# Patient Record
Sex: MALE
[De-identification: names, ages, dates, MRNs, and addresses within clinical notes are randomized per-mention and may not be internally consistent; named-entity substitution may affect disease eponyms.]

---

## 2018-11-27 ENCOUNTER — HOSPITAL ENCOUNTER (INPATIENT)
Dept: HOSPITAL 14 - H.ER | Age: 43
LOS: 4 days | Discharge: HOME | DRG: 710 | End: 2018-12-01
Attending: HOSPITALIST | Admitting: HOSPITALIST
Payer: COMMERCIAL

## 2018-11-27 VITALS — BODY MASS INDEX: 20.3 KG/M2

## 2018-11-27 DIAGNOSIS — A41.9: Primary | ICD-10-CM

## 2018-11-27 DIAGNOSIS — R51: ICD-10-CM

## 2018-11-27 DIAGNOSIS — L02.31: ICD-10-CM

## 2018-11-27 DIAGNOSIS — Z80.3: ICD-10-CM

## 2018-11-27 DIAGNOSIS — R07.0: ICD-10-CM

## 2018-11-27 DIAGNOSIS — B95.62: ICD-10-CM

## 2018-11-27 DIAGNOSIS — M54.6: ICD-10-CM

## 2018-11-27 DIAGNOSIS — L03.317: ICD-10-CM

## 2018-11-27 DIAGNOSIS — R00.0: ICD-10-CM

## 2018-11-27 LAB
ALBUMIN SERPL-MCNC: 4.4 G/DL (ref 3.5–5)
ALBUMIN/GLOB SERPL: 1.1 {RATIO} (ref 1–2.1)
ALT SERPL-CCNC: 30 U/L (ref 21–72)
APTT BLD: 32.3 SECONDS (ref 25.6–37.1)
AST SERPL-CCNC: 42 U/L (ref 17–59)
BASE EXCESS BLDV CALC-SCNC: 3.8 MMOL/L (ref 0–2)
BASOPHILS # BLD AUTO: 0.1 K/UL (ref 0–0.2)
BASOPHILS NFR BLD: 0.4 % (ref 0–2)
BUN SERPL-MCNC: 18 MG/DL (ref 9–20)
CALCIUM SERPL-MCNC: 9 MG/DL (ref 8.4–10.2)
EOSINOPHIL # BLD AUTO: 0.1 K/UL (ref 0–0.7)
EOSINOPHIL NFR BLD: 0.6 % (ref 0–4)
EOSINOPHIL NFR BLD: 1 % (ref 0–7)
ERYTHROCYTE [DISTWIDTH] IN BLOOD BY AUTOMATED COUNT: 13.1 % (ref 11.5–14.5)
GFR NON-AFRICAN AMERICAN: > 60
HGB BLD-MCNC: 14.4 G/DL (ref 12–18)
INR PPP: 1.2
LYMPHOCYTE: 3 % (ref 20–50)
LYMPHOCYTES # BLD AUTO: 1.1 K/UL (ref 1–4.3)
LYMPHOCYTES NFR BLD AUTO: 7.6 % (ref 20–40)
MCH RBC QN AUTO: 29.1 PG (ref 27–31)
MCHC RBC AUTO-ENTMCNC: 33.2 G/DL (ref 33–37)
MCV RBC AUTO: 87.7 FL (ref 80–94)
MONOCYTE: 8 % (ref 0–10)
MONOCYTES # BLD: 1.2 K/UL (ref 0–0.8)
MONOCYTES NFR BLD: 8.7 % (ref 0–10)
NEUTROPHILS # BLD: 11.7 K/UL (ref 1.8–7)
NEUTROPHILS NFR BLD AUTO: 82.7 % (ref 50–75)
NEUTROPHILS NFR BLD AUTO: 85 % (ref 42–75)
NEUTS BAND NFR BLD: 2 % (ref 0–2)
NRBC BLD AUTO-RTO: 0.1 % (ref 0–0)
PCO2 BLDV: 42 MMHG (ref 40–60)
PH BLDV: 7.44 [PH] (ref 7.32–7.43)
PLATELET # BLD EST: NORMAL 10*3/UL
PLATELET # BLD: 234 K/UL (ref 130–400)
PMV BLD AUTO: 9 FL (ref 7.2–11.7)
PROTHROMBIN TIME: 13.9 SECONDS (ref 9.8–13.1)
RBC # BLD AUTO: 4.95 MIL/UL (ref 4.4–5.9)
RBC MORPH BLD: NORMAL
TOTAL CELLS COUNTED BLD: 100
VARIANT LYMPHS NFR BLD MANUAL: 1 % (ref 0–0)
VENOUS BLOOD FIO2: 21 %
VENOUS BLOOD GAS PO2: 38 MM/HG (ref 30–55)
WBC # BLD AUTO: 14.1 K/UL (ref 4.8–10.8)

## 2018-11-27 RX ADMIN — WATER SCH MLS/HR: 1 INJECTION INTRAMUSCULAR; INTRAVENOUS; SUBCUTANEOUS at 22:07

## 2018-11-27 NOTE — CP.PCM.HP
History of Present Illness





- History of Present Illness


History of Present Illness: 





HPI


Pt is a 43 y/o male who presented with [facility] ED with complaints of swelling, 

pain, and discharge from his buttock for the past 4 days. Reports that he first 

noticed pain and swelling than 3 days ago, he attempted to squeeze it and 

noticed bloody milky discharge. He denies fevers, but reports chills. Denies hx 

of trauma, scratches, or insect bites to the area. Also denies diarrhea, 

constipation, hematochezia, abdominal pain, n/v. Traveled from [facility] 7 months

ago.


 


ROS: + dark urine





PMD: None


PMHX: Headaches


PSurgHx: Denies


Medications: Ibuprofen 600mg po for headaches prn


NKDA


FmHx: Mother-Breast Ca, Sister- childhood paralysis


Social: Born in [facility], came 7 months ago. Works various jobs in maintenance. 

Denies sex with men. Denies smoking, alcohol use, or illicit drug use.  





ED Course:


-Vitals: T 101.1,  /82, O2 Sat 100% on rm air


-CBC: WBC 14.1,no bands, Lactate 1.1 CMP unremarkable


-Pelvic CT ordered


-Surgery Consulted





ED Interventions:


-S/P Clindamycin 


-S/P Torodol


-S/P NS 1000ml bolus




















Present on Admission





- Present on Admission


Any Indicators Present on Admission: No





Past Patient History





- Past Social History


Alcohol: None


Drugs: Denies





- PSYCHIATRIC


Hx Substance Use: No





- SURGICAL HISTORY


Hx Surgeries: No





Meds


Allergies/Adverse Reactions: 


                                    Allergies











Allergy/AdvReac Type Severity Reaction Status Date / Time


 


No Known Allergies Allergy   Verified 11/27/18 10:56














Physical Exam





- Constitutional


Appears: No Acute Distress





- Head Exam


Head Exam: ATRAUMATIC





- Eye Exam


Eye Exam: Normal appearance





- ENT Exam


ENT Exam: Mucous Membranes Moist





- Respiratory Exam


Respiratory Exam: Clear to Auscultation Bilateral.  absent: Rales, Wheezes





- Cardiovascular Exam


Cardiovascular Exam: REGULAR RHYTHM, +S1, +S2.  absent: Systolic Murmur





- GI/Abdominal Exam


GI & Abdominal Exam: Normal Bowel Sounds, Soft.  absent: Tenderness





- Rectal Exam


Rectal Exam: NORMAL INSPECTION





- Extremities Exam


Extremities exam: Positive for: normal capillary refill, normal inspection, 

pedal pulses present.  Negative for: calf tenderness, pedal edema, tenderness





- Neurological Exam


Neurological exam: Alert, Oriented x3





- Psychiatric Exam


Psychiatric exam: Normal Affect





- Skin


Additional comments: 


 Right inferior gluteal region- ~8cm x 4cm erythematous region, raised, 

diffusely indurated, no fluctuance, no discharge, extremely tender to palpation,

warm. 








Results





- Vital Signs


Recent Vital Signs: 





                                Last Vital Signs











Temp  97.8 F   11/27/18 17:25


 


Pulse  86   11/27/18 17:25


 


Resp  19   11/27/18 17:25


 


BP  120/69   11/27/18 17:25


 


Pulse Ox  98   11/27/18 17:25














- Labs


Result Diagrams: 


                                 11/27/18 12:45





                                 11/27/18 12:45


Labs: 





                         Laboratory Results - last 24 hr











  11/27/18 11/27/18 11/27/18





  12:23 12:45 12:45


 


WBC   14.1 H 


 


RBC   4.95 


 


Hgb   14.4 


 


Hct   43.4 


 


MCV   87.7 


 


MCH   29.1 


 


MCHC   33.2 


 


RDW   13.1 


 


Plt Count   234 


 


MPV   9.0 


 


Neut % (Auto)   82.7 H 


 


Lymph % (Auto)   7.6 L 


 


Mono % (Auto)   8.7 


 


Eos % (Auto)   0.6 


 


Baso % (Auto)   0.4 


 


Neut # (Auto)   11.7 H 


 


Lymph # (Auto)   1.1 


 


Mono # (Auto)   1.2 H 


 


Eos # (Auto)   0.1 


 


Baso # (Auto)   0.1 


 


Neutrophils % (Manual)   85 H 


 


Band Neutrophils %   2 


 


Lymphocytes % (Manual)   3 L 


 


Reactive Lymphs %   1 H 


 


Monocytes % (Manual)   8 


 


Eosinophils % (Manual)   1 


 


Platelet Estimate   Normal 


 


RBC Morphology   Normal 


 


PT   


 


INR   


 


APTT   


 


pO2  38  


 


VBG pH  7.44 H  


 


VBG pCO2  42  


 


VBG HCO3  27.3  


 


VBG Total CO2  29.8 H  


 


VBG O2 Sat (Calc)  80.4 H  


 


VBG Base Excess  3.8 H  


 


VBG Potassium  3.6  


 


Sodium  134.0   135


 


Chloride  101.0   100


 


Glucose  104  


 


Lactate  1.1  


 


FiO2  21.0  


 


Potassium    4.6


 


Carbon Dioxide    25


 


Anion Gap    15


 


BUN    18


 


Creatinine    0.9


 


Est GFR ( Amer)    > 60


 


Est GFR (Non-Af Amer)    > 60


 


Random Glucose    98


 


Calcium    9.0


 


Phosphorus    3.1


 


Magnesium    2.0


 


Total Bilirubin    1.4 H


 


AST    42


 


ALT    30


 


Alkaline Phosphatase    65


 


Total Protein    8.5 H


 


Albumin    4.4


 


Globulin    4.0 H


 


Albumin/Globulin Ratio    1.1


 


Venous Blood Potassium  3.6  














  11/27/18





  12:45


 


WBC 


 


RBC 


 


Hgb 


 


Hct 


 


MCV 


 


MCH 


 


MCHC 


 


RDW 


 


Plt Count 


 


MPV 


 


Neut % (Auto) 


 


Lymph % (Auto) 


 


Mono % (Auto) 


 


Eos % (Auto) 


 


Baso % (Auto) 


 


Neut # (Auto) 


 


Lymph # (Auto) 


 


Mono # (Auto) 


 


Eos # (Auto) 


 


Baso # (Auto) 


 


Neutrophils % (Manual) 


 


Band Neutrophils % 


 


Lymphocytes % (Manual) 


 


Reactive Lymphs % 


 


Monocytes % (Manual) 


 


Eosinophils % (Manual) 


 


Platelet Estimate 


 


RBC Morphology 


 


PT  13.9 H


 


INR  1.2


 


APTT  32.3


 


pO2 


 


VBG pH 


 


VBG pCO2 


 


VBG HCO3 


 


VBG Total CO2 


 


VBG O2 Sat (Calc) 


 


VBG Base Excess 


 


VBG Potassium 


 


Sodium 


 


Chloride 


 


Glucose 


 


Lactate 


 


FiO2 


 


Potassium 


 


Carbon Dioxide 


 


Anion Gap 


 


BUN 


 


Creatinine 


 


Est GFR ( Amer) 


 


Est GFR (Non-Af Amer) 


 


Random Glucose 


 


Calcium 


 


Phosphorus 


 


Magnesium 


 


Total Bilirubin 


 


AST 


 


ALT 


 


Alkaline Phosphatase 


 


Total Protein 


 


Albumin 


 


Globulin 


 


Albumin/Globulin Ratio 


 


Venous Blood Potassium 














Assessment & Plan





- Assessment and Plan (Free Text)


Assessment: 








Pt is a 43 y/o male who presented with [facility] ED admitted with Sepsis secondary to

Cellulites over gluteal region, CT scan ruled out abscess.





Cellulites, gluteal region


-Septic on admission: Febrile 101.1, Tachycardic 121, WBC 14.1, Lactate 1.1


-Source of infection likely Cellulitis; will get UA as patient was complaining 

of dark urine


-S/P Clindamycin x1 and 1L Bolus NS in ED


-Start Vancoymycin and Zosyn for empiric coverage


-Leg CT report: no drainable collection, no gas


-Surgery on board


-F/U BCx and Wcx


-Wound Care


-pain management as ordered





Diet


-Regular





DVT ppx


-Lovenox 40mg SC





Full Code








Discussed case with Dr. Larson

## 2018-11-27 NOTE — CP.PCM.CON
<Bridger Kent - Last Filed: 11/27/18 13:00>





History of Present Illness





- History of Present Illness


History of Present Illness: 





Surgery 





42 M w no sig PMH came with R buttock pain, redness and swelling started 1 week 

ago. It started w a small pimple. He sqeezed it and made it worse. There were sm

all amount of sanguinous , purulent discharge. Since then sxs got worse. Reports

fevers and concentrated urine. Pt had fever of 101 in ED. Denies nausea, 

vomiting, diarrhea, trauma, CP, SOB. 





PMH denies


PSH denies


SS no drugs, no etoh, no smoking. came from [facility] 7 month ago. LIves by 

himself. Family in [facility]. Does odd jobs, construction, restuarant, office 

works. Denies sex w men.   





 





Review of Systems





- Review of Systems


Review of Systems: 





See HPI 





Past Patient History





- Past Social History


Alcohol: None


Drugs: Denies





- PSYCHIATRIC


Hx Substance Use: No





- SURGICAL HISTORY


Hx Surgeries: No





Meds


Allergies/Adverse Reactions: 


                                    Allergies











Allergy/AdvReac Type Severity Reaction Status Date / Time


 


No Known Allergies Allergy   Verified 11/27/18 10:56














- Medications


Medications: 


                               Current Medications





Clindamycin Phosphate (Cleocin)  600 mg in 50 mls @ 50 mls/hr IVPB STAT STA; 

Protocol


   Stop: 11/27/18 12:39











Physical Exam





- Constitutional


Appears: No Acute Distress





- Head Exam


Head Exam: ATRAUMATIC, NORMAL INSPECTION, NORMOCEPHALIC





- Eye Exam


Eye Exam: EOMI, Normal appearance, PERRL


Pupil Exam: NORMAL ACCOMODATION, PERRL





- ENT Exam


ENT Exam: Mucous Membranes Moist





- Neck Exam


Neck exam: Positive for: Normal Inspection





- Respiratory Exam


Respiratory Exam: NORMAL BREATHING PATTERN





- Cardiovascular Exam


Cardiovascular Exam: Tachycardia





- GI/Abdominal Exam


GI & Abdominal Exam: Soft.  absent: Distended, Tenderness





- Rectal Exam


Rectal Exam: NORMAL INSPECTION.  absent: Black Stool, Bloody Stool, Hemorrhoids,

Fecal Impaction


Additional comments: 


[id number] eryhtema and induartion of R buttocks. Central excoriation. Minimal 

bloody, purulent drainage.  





-  Exam


 Exam: NORMAL INSPECTION





- Extremities Exam


Extremities exam: Positive for: full ROM, normal inspection





- Back Exam


Back exam: NORMAL INSPECTION





- Neurological Exam


Neurological exam: Alert, CN II-XII Intact, Normal Gait, Oriented x3, Reflexes 

Normal





- Psychiatric Exam


Psychiatric exam: Normal Affect, Normal Mood





- Skin


Skin Exam: Erythema, Warm





Results





- Vital Signs


Recent Vital Signs: 


                                Last Vital Signs











Temp  101.1 F H  11/27/18 10:08


 


Pulse  121 H  11/27/18 10:08


 


Resp      


 


BP  107/82   11/27/18 10:08


 


Pulse Ox  100   11/27/18 11:54














Assessment & Plan





- Assessment and Plan (Free Text)


Assessment: 





R buttocks cellulitis possible abscess: non fluctuant





-CT


-ABX


-f/u cx 


-IVF


-Warm compress: Not ready for I & D yet. 





DW Dr. Pace 





<Jonnathan Lizarraga - Last Filed: 11/28/18 13:21>





History of Present Illness





- History of Present Illness


History of Present Illness: 





Patient was seen and examined at the bedside.  Agree with resident's note above.





Meds





- Medications


Medications: 


                               Current Medications





Acetaminophen (Tylenol 325mg Tab)  650 mg PO Q6 PRN


   PRN Reason: fever


   Last Admin: 11/28/18 08:29 Dose:  650 mg


Docusate Sodium (Colace)  100 mg PO DAILY [facility]


   Last Admin: 11/28/18 08:30 Dose:  100 mg


Enoxaparin Sodium (Lovenox)  40 mg SC DAILY [facility]; Protocol


   Last Admin: 11/28/18 09:18 Dose:  40 mg


Vancomycin HCl 1 gm/ Sodium (Chloride)  250 mls @ 166.667 mls/hr IVPB 

Q12@0400,1600 [facility]; Protocol


   Last Admin: 11/28/18 04:16 Dose:  166.667 mls/hr


Sodium Chloride (Sodium Chloride 0.9%)  1,000 mls @ 100 mls/hr IV .Q10H [facility]


   Stop: 11/28/18 13:22


   Last Admin: 11/28/18 09:53 Dose:  Not Given


Piperacillin Sod/Tazobactam (Sod 3.375 gm/ Sodium Chloride)  100 mls @ 100 

mls/hr IVPB Q6 [facility]; Protocol


   Last Admin: 11/28/18 09:17 Dose:  100 mls/hr


Ibuprofen (Motrin Tab)  600 mg PO Q6H PRN


   PRN Reason: Pain, Mild (1-3)


Ketorolac Tromethamine (Toradol)  15 mg IVP Q6 PRN


   PRN Reason: Pain, moderate (4-7)


   Last Admin: 11/28/18 03:14 Dose:  15 mg











Results





- Vital Signs


Recent Vital Signs: 


                                Last Vital Signs











Temp  98.7 F   11/28/18 08:08


 


Pulse  71   11/28/18 08:08


 


Resp  18   11/28/18 08:08


 


BP  100/57 L  11/28/18 08:08


 


Pulse Ox  98   11/28/18 08:08














- Labs


Result Diagrams: 


                                 11/28/18 06:05





                                 11/27/18 12:45


Labs: 


                         Laboratory Results - last 24 hr











  11/27/18 11/28/18





  12:45 06:05


 


WBC   9.9


 


RBC   4.54


 


Hgb   13.2


 


Hct   39.3


 


MCV   86.6


 


MCH   29.0


 


MCHC   33.5


 


RDW   13.1


 


Plt Count   194


 


MPV   8.6


 


Neut % (Auto)   77.9 H


 


Lymph % (Auto)   10.9 L


 


Mono % (Auto)   7.9


 


Eos % (Auto)   2.8


 


Baso % (Auto)   0.5


 


Neut # (Auto)   7.7 H


 


Lymph # (Auto)   1.1


 


Mono # (Auto)   0.8


 


Eos # (Auto)   0.3


 


Baso # (Auto)   0.0


 


Neutrophils % (Manual)  85 H 


 


Band Neutrophils %  2 


 


Lymphocytes % (Manual)  3 L 


 


Reactive Lymphs %  1 H 


 


Monocytes % (Manual)  8 


 


Eosinophils % (Manual)  1 


 


Platelet Estimate  Normal 


 


RBC Morphology  Normal 














- Imaging and Cardiology


  ** CT scan - pelvis


Status: Image reviewed by me, Report reviewed by me





Assessment & Plan





- Assessment and Plan (Free Text)


Plan: 





- Continue antibiotics


- pain control


- NPO after midnight


- plan for Debridment and I&D in OR tomorrow


- Will follow

## 2018-11-27 NOTE — ED PDOC
HPI: General Adult


Time Seen by Provider: 11/27/18 11:07


Chief Complaint (Nursing): Abnormal Skin Integrity


Chief Complaint (Provider): Abscess


History Per: Patient


History/Exam Limitations: no limitations


Onset/Duration Of Symptoms: Days


Current Symptoms Are (Timing): Still Present


Additional Complaint(s): 





Pt. with abscess to buttox for 7 days.  States it started as a pimple and then 

grew bigger.  Purulent dc coming from the area.  Painful.  No numbness, tingles,

weakness, abd pain, chest pain, dyspnea.  No fever.  No weakness. 





Past Medical History


Reviewed: Nursing Documentation, Vital Signs


Vital Signs: 





                                Last Vital Signs











Temp  101.1 F H  11/27/18 10:08


 


Pulse  121 H  11/27/18 10:08


 


Resp      


 


BP  107/82   11/27/18 10:08


 


Pulse Ox  100   11/27/18 10:08














- Medical History


PMH: No Chronic Diseases





- Surgical History


Surgical History: No Surg Hx





- Family History


Family History: States: Unknown Family Hx





- Living Arrangements


Living Arrangements: With Family





- Social History


Alcohol: None


Drugs: Denies





- Allergies


Allergies/Adverse Reactions: 


                                    Allergies











Allergy/AdvReac Type Severity Reaction Status Date / Time


 


No Known Allergies Allergy   Verified 11/27/18 10:56














Review of Systems


ROS Statement: Except As Marked, All Systems Reviewed And Found Negative


Musculoskeletal: Positive for: Other (buttox)


Skin: Positive for: Rash





Physical Exam





- Reviewed


Nursing Documentation Reviewed: Yes


Vital Signs Reviewed: Yes





- Physical Exam


Appears: Positive for: No Acute Distress


Head Exam: Positive for: ATRAUMATIC, NORMAL INSPECTION, NORMOCEPHALIC


Skin: Positive for: Normal Color, Warm, DRY


Eye Exam: Positive for: EOMI, Normal appearance, PERRL


ENT: Positive for: Normal ENT Inspection


Neck: Positive for: Normal, Painless ROM


Cardiovascular/Chest: Positive for: Regular Rate, Rhythm


Respiratory: Positive for: CNT, Normal Breath Sounds


Gastrointestinal/Abdominal: Positive for: Normal Exam, Soft.  Negative for: 

Tenderness


Back: Positive for: Other (R buttox with 10cm diameter induration, erythema, 

tender, fluctuance; center with 2 cm diameter abrased skin).  Negative for: L 

CVA Tenderness, R CVA Tenderness


Extremity: Positive for: Normal ROM


Neurologic/Psych: Positive for: Alert, Oriented





- Laboratory Results


Result Diagrams: 


                                 11/27/18 12:45





                                 11/27/18 12:45


Interpretation Of Abn Labs: 14.1 wbc





- ECG


O2 Sat by Pulse Oximetry: 100


Pulse Ox Interpretation: Normal





- Progress


ED Course And Treament: 





1552:  Dr. Barrera to fu on ct.  Pt. getting antibiotics.  Has no pcp.  





Disposition





- Clinical Impression


Clinical Impression: 


 Sepsis, Cellulitis








- Patient ED Disposition


Is Patient to be Admitted: No


Counseled Patient/Family Regarding: Studies Performed, Diagnosis





- Disposition


Disposition Time: 15:52


Condition: FAIR


Patient Signed Over To: Phi Barrera

## 2018-11-27 NOTE — CT
Date of service: 



11/27/2018



PROCEDURE:  CT Pelvis with contrast



HISTORY:

eval for large deep abscess right glut



COMPARISON:

None available.



TECHNIQUE:

Contiguous axial images of the pelvis with contrast. Coronal and 

sagittal reformats generated.



Contrast dose: 90 cc Omnipaque 300



Radiation dose:



Total exam DLP = 244.58 mGy-cm.



This CT exam was performed using one or more of the following dose 

reduction techniques: Automated exposure control, adjustment of the 

mA and/or kV according to patient size, and/or use of iterative 

reconstruction technique.



FINDINGS:



BLADDER:

Unremarkable. No mass. 



REPRODUCTIVE ORGANS:

Unremarkable. 



VISUALIZED BOWEL:

Unremarkable.



PERITONEUM:

Unremarkable, as visualized. No free fluid. No free air. 



LYMPH NODES:

Unremarkable. No enlarged lymph nodes. 



VASCULATURE:

No aortic atherosclerotic calcification or mural plaque present. 



BONES:

No fracture or focal lesion.  



OTHER FINDINGS:

Cutaneous and subcutaneous inflammatory changes extending into the 

gluteus virginia muscle.  The muscle and surrounding soft tissues are 

edematous.  No drainable collection.  The findings do not extend into 

the pelvis.  No visible adjacent osseous abnormality.  At a more 

caudal level inflammatory changes extend medially to the adductor 

rajat and gross ileus muscles. 



IMPRESSION:

Inflammatory changes extending from the skin surface to the right 

gluteus virginia muscle.  The muscle is edematous without drainable 

collection. 



The more medial and caudal extent is bordered by the adductor rajat 

and gracilis musculature which do not appear to be grossly involved 

or affected by this process.



No discrete abscess, drainable collection, sinus tract or fistulous 

communication identified

## 2018-11-28 LAB
BASOPHILS # BLD AUTO: 0 K/UL (ref 0–0.2)
BASOPHILS NFR BLD: 0.5 % (ref 0–2)
EOSINOPHIL # BLD AUTO: 0.3 K/UL (ref 0–0.7)
EOSINOPHIL NFR BLD: 2.8 % (ref 0–4)
ERYTHROCYTE [DISTWIDTH] IN BLOOD BY AUTOMATED COUNT: 13.1 % (ref 11.5–14.5)
HGB BLD-MCNC: 13.2 G/DL (ref 12–18)
LYMPHOCYTES # BLD AUTO: 1.1 K/UL (ref 1–4.3)
LYMPHOCYTES NFR BLD AUTO: 10.9 % (ref 20–40)
MCH RBC QN AUTO: 29 PG (ref 27–31)
MCHC RBC AUTO-ENTMCNC: 33.5 G/DL (ref 33–37)
MCV RBC AUTO: 86.6 FL (ref 80–94)
MONOCYTES # BLD: 0.8 K/UL (ref 0–0.8)
MONOCYTES NFR BLD: 7.9 % (ref 0–10)
NEUTROPHILS # BLD: 7.7 K/UL (ref 1.8–7)
NEUTROPHILS NFR BLD AUTO: 77.9 % (ref 50–75)
NRBC BLD AUTO-RTO: 0 % (ref 0–0)
PLATELET # BLD: 194 K/UL (ref 130–400)
PMV BLD AUTO: 8.6 FL (ref 7.2–11.7)
RBC # BLD AUTO: 4.54 MIL/UL (ref 4.4–5.9)
WBC # BLD AUTO: 9.9 K/UL (ref 4.8–10.8)

## 2018-11-28 RX ADMIN — ENOXAPARIN SODIUM SCH MG: 40 INJECTION SUBCUTANEOUS at 09:18

## 2018-11-28 RX ADMIN — WATER SCH MLS/HR: 1 INJECTION INTRAMUSCULAR; INTRAVENOUS; SUBCUTANEOUS at 03:09

## 2018-11-28 RX ADMIN — WATER SCH MLS/HR: 1 INJECTION INTRAMUSCULAR; INTRAVENOUS; SUBCUTANEOUS at 21:24

## 2018-11-28 RX ADMIN — WATER SCH MLS/HR: 1 INJECTION INTRAMUSCULAR; INTRAVENOUS; SUBCUTANEOUS at 09:17

## 2018-11-28 RX ADMIN — WATER SCH MLS/HR: 1 INJECTION INTRAMUSCULAR; INTRAVENOUS; SUBCUTANEOUS at 15:54

## 2018-11-28 NOTE — CP.PCM.PN
Subjective





- Date & Time of Evaluation


Date of Evaluation: 11/28/18


Time of Evaluation: 11:34





- Subjective


Subjective: 





Pt is a 43 y/o male patient seen and evaluated at the bedside for right gluteal 

cellulitis. Patient states that the pain in his buttock decreased since 

yesterday to 6/10 on VAS scale. He states  that he has headache now 7/10. He 

states that his usine is darker than normal but he denies any burning sensation 

with urination. He denies any overnight fevers or chills. He denies nausea, 

vomitting, diarrhea, constipation, hematochezia, abdominal pain. 


 








Objective





- Vital Signs/Intake and Output


Vital Signs (last 24 hours): 


                                        











Temp Pulse Resp BP Pulse Ox


 


 98.7 F   71   18   100/57 L  98 


 


 11/28/18 08:08  11/28/18 08:08  11/28/18 08:08  11/28/18 08:08  11/28/18 08:08











- Medications


Medications: 


                               Current Medications





Acetaminophen (Tylenol 325mg Tab)  650 mg PO Q6 PRN


   PRN Reason: fever


   Last Admin: 11/28/18 08:29 Dose:  650 mg


Docusate Sodium (Colace)  100 mg PO DAILY [facility]


   Last Admin: 11/28/18 08:30 Dose:  100 mg


Enoxaparin Sodium (Lovenox)  40 mg SC DAILY [facility]; Protocol


   Last Admin: 11/28/18 09:18 Dose:  40 mg


Vancomycin HCl 1 gm/ Sodium (Chloride)  250 mls @ 166.667 mls/hr IVPB 

Q12@0400,1600 [facility]; Protocol


   Last Admin: 11/28/18 04:16 Dose:  166.667 mls/hr


Sodium Chloride (Sodium Chloride 0.9%)  1,000 mls @ 100 mls/hr IV .Q10H [facility]


   Stop: 11/28/18 13:22


   Last Admin: 11/28/18 09:53 Dose:  Not Given


Piperacillin Sod/Tazobactam (Sod 3.375 gm/ Sodium Chloride)  100 mls @ 100 

mls/hr IVPB Q6 [facility]; Protocol


   Last Admin: 11/28/18 09:17 Dose:  100 mls/hr


Ibuprofen (Motrin Tab)  600 mg PO Q6H PRN


   PRN Reason: Pain, Mild (1-3)


Ketorolac Tromethamine (Toradol)  15 mg IVP Q6 PRN


   PRN Reason: Pain, moderate (4-7)


   Last Admin: 11/28/18 03:14 Dose:  15 mg











- Labs


Labs: 


                                        





                                 11/28/18 06:05 





                                 11/27/18 12:45 





                                        











PT  13.9 Seconds (9.8-13.1)  H  11/27/18  12:45    


 


INR  1.2   11/27/18  12:45    


 


APTT  32.3 Seconds (25.6-37.1)   11/27/18  12:45    














- Constitutional


Appears: Well, No Acute Distress





- Head Exam


Head Exam: ATRAUMATIC, NORMOCEPHALIC





- Eye Exam


Eye Exam: EOMI, Normal appearance, PERRL


Pupil Exam: PERRL





- ENT Exam


ENT Exam: Mucous Membranes Moist, Normal Exam





- Neck Exam


Neck Exam: Full ROM, Normal Inspection





- Respiratory Exam


Respiratory Exam: Clear to Ausculation Bilateral, NORMAL BREATHING PATTERN





- Cardiovascular Exam


Cardiovascular Exam: REGULAR RHYTHM, RRR





- GI/Abdominal Exam


GI & Abdominal Exam: Soft, Normal Bowel Sounds





- Extremities Exam


Extremities Exam: Normal Capillary Refill, Normal Inspection





- Back Exam


Back Exam: NORMAL INSPECTION





- Neurological Exam


Neurological Exam: Alert, Awake, Oriented x3


Neuro motor strength exam: Left Upper Extremity: 5, Right Upper Extremity: 5, 

Left Lower Extremity: 5, Right Lower Extremity: 5





- Psychiatric Exam


Psychiatric exam: Normal Affect, Normal Mood





- Skin


Skin Exam: Dry


Additional comments: 








Right inferior gluteal region around 8cm x 4cm erythematous region, raised, 

diffusely indurated, no fluctuance, no discharge, extremely tender to palpation,

warm. central hard scab. No fluctuance

















Assessment and Plan





- Assessment and Plan (Free Text)


Assessment: 





Pt is a 43 y/o male patient seen and evaluated at the bedside for Sepsis 

secondary to Cellulites over gluteal region.








Plan: 





Cellulites, gluteal region


-Septic on admission: afebrile  WBC 9.9


-Source of infection likely Cellulitis


-C/W Vancoymycin and Zosyn IV abx


-Leg CT report: no drainable collection, no gas


-Surgery on board; reccs appreciated


-BCx: pending


-Wcx; gram positive cocci


-UA; pending


-Local wound Care


-C/W pain management as ordered





Diet


-Regular





DVT ppx


-Lovenox 40mg SC

## 2018-11-28 NOTE — CP.PCM.PN
<Eleanor Hall JOSE - Last Filed: 11/28/18 11:40>





Subjective





- Date & Time of Evaluation


Date of Evaluation: 11/28/18


Time of Evaluation: 11:32





- Subjective


Subjective: 


General Surgery


Today pt reports his pain has improved somewhat, as he is now able to sit 

normally without excruciating pain.  He denies having any chills or fever from 

the overnight.  Additionally he reports having a headache.





Labs and vitals noted





PE


Gen: Pt sitting in bed in NAD


Skin: warm, (+) right lateral/inferior buttock with ~20cm x 18cm area of erythe

ma, induration, center excoriation, (+) hot to touch, (-) fluctuance, (+) 

tender, (-) streaking, (+) margins not extended previously marked area, slight 

improvement.


Cardio: s1s2 RRR


Lungs: CTA bilaterally


Abd: Soft NTND


Extr: (-) calf tenderness bilaterally





A/P


Cellulitis of left buttock





Continue iv antibiotics


Warm compresses


Pain medications prn as ordered


Will need drainage once area becomes fluctuant.


Monitor labs and vitals.


 





Objective





- Vital Signs/Intake and Output


Vital Signs (last 24 hours): 


                                        











Temp Pulse Resp BP Pulse Ox


 


 98.7 F   71   18   100/57 L  98 


 


 11/28/18 08:08  11/28/18 08:08  11/28/18 08:08  11/28/18 08:08  11/28/18 08:08











- Medications


Medications: 


                               Current Medications





Acetaminophen (Tylenol 325mg Tab)  650 mg PO Q6 PRN


   PRN Reason: fever


   Last Admin: 11/28/18 08:29 Dose:  650 mg


Docusate Sodium (Colace)  100 mg PO DAILY [facility]


   Last Admin: 11/28/18 08:30 Dose:  100 mg


Enoxaparin Sodium (Lovenox)  40 mg SC DAILY [facility]; Protocol


   Last Admin: 11/28/18 09:18 Dose:  40 mg


Vancomycin HCl 1 gm/ Sodium (Chloride)  250 mls @ 166.667 mls/hr IVPB 

Q12@0400,1600 [facility]; Protocol


   Last Admin: 11/28/18 04:16 Dose:  166.667 mls/hr


Sodium Chloride (Sodium Chloride 0.9%)  1,000 mls @ 100 mls/hr IV .Q10H [facility]


   Stop: 11/28/18 13:22


   Last Admin: 11/28/18 09:53 Dose:  Not Given


Piperacillin Sod/Tazobactam (Sod 3.375 gm/ Sodium Chloride)  100 mls @ 100 

mls/hr IVPB Q6 [facility]; Protocol


   Last Admin: 11/28/18 09:17 Dose:  100 mls/hr


Ibuprofen (Motrin Tab)  600 mg PO Q6H PRN


   PRN Reason: Pain, Mild (1-3)


Ketorolac Tromethamine (Toradol)  15 mg IVP Q6 PRN


   PRN Reason: Pain, moderate (4-7)


   Last Admin: 11/28/18 03:14 Dose:  15 mg











- Labs


Labs: 


                                        





                                 11/28/18 06:05 





                                 11/27/18 12:45 





                                        











PT  13.9 Seconds (9.8-13.1)  H  11/27/18  12:45    


 


INR  1.2   11/27/18  12:45    


 


APTT  32.3 Seconds (25.6-37.1)   11/27/18  12:45    














<Jonnathan Lizarraga - Last Filed: 11/28/18 13:01>





Subjective





- Subjective


Subjective: 





Patient was seen and examined at the bedside. Agree with the note above.





Objective





- Vital Signs/Intake and Output


Vital Signs (last 24 hours): 


                                        











Temp Pulse Resp BP Pulse Ox


 


 98.7 F   71   18   100/57 L  98 


 


 11/28/18 08:08  11/28/18 08:08  11/28/18 08:08  11/28/18 08:08  11/28/18 08:08











- Medications


Medications: 


                               Current Medications





Acetaminophen (Tylenol 325mg Tab)  650 mg PO Q6 PRN


   PRN Reason: fever


   Last Admin: 11/28/18 08:29 Dose:  650 mg


Docusate Sodium (Colace)  100 mg PO DAILY [facility]


   Last Admin: 11/28/18 08:30 Dose:  100 mg


Enoxaparin Sodium (Lovenox)  40 mg SC DAILY [facility]; Protocol


   Last Admin: 11/28/18 09:18 Dose:  40 mg


Vancomycin HCl 1 gm/ Sodium (Chloride)  250 mls @ 166.667 mls/hr IVPB 

Q12@0400,1600 [facility]; Protocol


   Last Admin: 11/28/18 04:16 Dose:  166.667 mls/hr


Sodium Chloride (Sodium Chloride 0.9%)  1,000 mls @ 100 mls/hr IV .Q10H [facility]


   Stop: 11/28/18 13:22


   Last Admin: 11/28/18 09:53 Dose:  Not Given


Piperacillin Sod/Tazobactam (Sod 3.375 gm/ Sodium Chloride)  100 mls @ 100 

mls/hr IVPB Q6 [facility]; Protocol


   Last Admin: 11/28/18 09:17 Dose:  100 mls/hr


Ibuprofen (Motrin Tab)  600 mg PO Q6H PRN


   PRN Reason: Pain, Mild (1-3)


Ketorolac Tromethamine (Toradol)  15 mg IVP Q6 PRN


   PRN Reason: Pain, moderate (4-7)


   Last Admin: 11/28/18 03:14 Dose:  15 mg











- Labs


Labs: 


                                        





                                 11/28/18 06:05 





                                 11/27/18 12:45 





                                        











PT  13.9 Seconds (9.8-13.1)  H  11/27/18  12:45    


 


INR  1.2   11/27/18  12:45    


 


APTT  32.3 Seconds (25.6-37.1)   11/27/18  12:45    














Assessment and Plan





- Assessment and Plan (Free Text)


Assessment: 





42 y.o. male with right buttocks abscess and necrotic tissue


Plan: 





- Continue antibiotics


- Pain control


- NPO after midnight


- To OR tomorrow for I&D


- Will follow

## 2018-11-28 NOTE — CP.PCM.CON
History of Present Illness





- History of Present Illness


History of Present Illness: 


General Surgery





Agree with resident note.  Today pt reports his pain has improved somewhat, as 

he is now able to sit normally without excruciating pain.  He denies having any 

chills or fever from the overnight.  He reports he still has a headache since 

admission.








Past Patient History





- Past Medical History & Family History


Past Medical History?: No





- Past Social History


Smoking Status: Never Smoked





- MUSCULOSKELETAL/RHEUMATOLOGICAL


Hx Falls: No





- PSYCHIATRIC


Hx Psychophysiologic Disorder: No


Hx Substance Use: No





- SURGICAL HISTORY


Hx Surgeries: No





- ANESTHESIA


Hx Anesthesia: No





Meds


Allergies/Adverse Reactions: 


                                    Allergies











Allergy/AdvReac Type Severity Reaction Status Date / Time


 


No Known Allergies Allergy   Verified 11/27/18 10:56














- Medications


Medications: 


                               Current Medications





Acetaminophen (Tylenol 325mg Tab)  650 mg PO Q6 PRN


   PRN Reason: fever


   Last Admin: 11/28/18 08:29 Dose:  650 mg


Docusate Sodium (Colace)  100 mg PO DAILY [facility]


   Last Admin: 11/28/18 08:30 Dose:  100 mg


Enoxaparin Sodium (Lovenox)  40 mg SC DAILY [facility]; Protocol


   Last Admin: 11/28/18 09:18 Dose:  40 mg


Vancomycin HCl 1 gm/ Sodium (Chloride)  250 mls @ 166.667 mls/hr IVPB 

Q12@0400,1600 [facility]; Protocol


   Last Admin: 11/28/18 04:16 Dose:  166.667 mls/hr


Sodium Chloride (Sodium Chloride 0.9%)  1,000 mls @ 100 mls/hr IV .Q10H [facility]


   Stop: 11/28/18 13:22


   Last Admin: 11/28/18 09:53 Dose:  Not Given


Piperacillin Sod/Tazobactam (Sod 3.375 gm/ Sodium Chloride)  100 mls @ 100 mls

/hr IVPB Q6 [facility]; Protocol


   Last Admin: 11/28/18 09:17 Dose:  100 mls/hr


Ibuprofen (Motrin Tab)  600 mg PO Q6H PRN


   PRN Reason: Pain, Mild (1-3)


Ketorolac Tromethamine (Toradol)  15 mg IVP Q6 PRN


   PRN Reason: Pain, moderate (4-7)


   Last Admin: 11/28/18 03:14 Dose:  15 mg











Results





- Vital Signs


Recent Vital Signs: 


                                Last Vital Signs











Temp  98.7 F   11/28/18 08:08


 


Pulse  71   11/28/18 08:08


 


Resp  18   11/28/18 08:08


 


BP  100/57 L  11/28/18 08:08


 


Pulse Ox  98   11/28/18 08:08














- Labs


Result Diagrams: 


                                 11/28/18 06:05





                                 11/27/18 12:45


Labs: 


                         Laboratory Results - last 24 hr











  11/27/18 11/27/18 11/27/18





  12:23 12:45 12:45


 


WBC   14.1 H 


 


RBC   4.95 


 


Hgb   14.4 


 


Hct   43.4 


 


MCV   87.7 


 


MCH   29.1 


 


MCHC   33.2 


 


RDW   13.1 


 


Plt Count   234 


 


MPV   9.0 


 


Neut % (Auto)   82.7 H 


 


Lymph % (Auto)   7.6 L 


 


Mono % (Auto)   8.7 


 


Eos % (Auto)   0.6 


 


Baso % (Auto)   0.4 


 


Neut # (Auto)   11.7 H 


 


Lymph # (Auto)   1.1 


 


Mono # (Auto)   1.2 H 


 


Eos # (Auto)   0.1 


 


Baso # (Auto)   0.1 


 


Neutrophils % (Manual)   85 H 


 


Band Neutrophils %   2 


 


Lymphocytes % (Manual)   3 L 


 


Reactive Lymphs %   1 H 


 


Monocytes % (Manual)   8 


 


Eosinophils % (Manual)   1 


 


Platelet Estimate   Normal 


 


RBC Morphology   Normal 


 


PT   


 


INR   


 


APTT   


 


pO2  38  


 


VBG pH  7.44 H  


 


VBG pCO2  42  


 


VBG HCO3  27.3  


 


VBG Total CO2  29.8 H  


 


VBG O2 Sat (Calc)  80.4 H  


 


VBG Base Excess  3.8 H  


 


VBG Potassium  3.6  


 


Sodium  134.0   135


 


Chloride  101.0   100


 


Glucose  104  


 


Lactate  1.1  


 


FiO2  21.0  


 


Potassium    4.6


 


Carbon Dioxide    25


 


Anion Gap    15


 


BUN    18


 


Creatinine    0.9


 


Est GFR ( Amer)    > 60


 


Est GFR (Non-Af Amer)    > 60


 


Random Glucose    98


 


Calcium    9.0


 


Phosphorus    3.1


 


Magnesium    2.0


 


Total Bilirubin    1.4 H


 


AST    42


 


ALT    30


 


Alkaline Phosphatase    65


 


Total Protein    8.5 H


 


Albumin    4.4


 


Globulin    4.0 H


 


Albumin/Globulin Ratio    1.1


 


Venous Blood Potassium  3.6  














  11/27/18 11/28/18





  12:45 06:05


 


WBC   9.9


 


RBC   4.54


 


Hgb   13.2


 


Hct   39.3


 


MCV   86.6


 


MCH   29.0


 


MCHC   33.5


 


RDW   13.1


 


Plt Count   194


 


MPV   8.6


 


Neut % (Auto)   77.9 H


 


Lymph % (Auto)   10.9 L


 


Mono % (Auto)   7.9


 


Eos % (Auto)   2.8


 


Baso % (Auto)   0.5


 


Neut # (Auto)   7.7 H


 


Lymph # (Auto)   1.1


 


Mono # (Auto)   0.8


 


Eos # (Auto)   0.3


 


Baso # (Auto)   0.0


 


Neutrophils % (Manual)  


 


Band Neutrophils %  


 


Lymphocytes % (Manual)  


 


Reactive Lymphs %  


 


Monocytes % (Manual)  


 


Eosinophils % (Manual)  


 


Platelet Estimate  


 


RBC Morphology  


 


PT  13.9 H 


 


INR  1.2 


 


APTT  32.3 


 


pO2  


 


VBG pH  


 


VBG pCO2  


 


VBG HCO3  


 


VBG Total CO2  


 


VBG O2 Sat (Calc)  


 


VBG Base Excess  


 


VBG Potassium  


 


Sodium  


 


Chloride  


 


Glucose  


 


Lactate  


 


FiO2  


 


Potassium  


 


Carbon Dioxide  


 


Anion Gap  


 


BUN  


 


Creatinine  


 


Est GFR ( Amer)  


 


Est GFR (Non-Af Amer)  


 


Random Glucose  


 


Calcium  


 


Phosphorus  


 


Magnesium  


 


Total Bilirubin  


 


AST  


 


ALT  


 


Alkaline Phosphatase  


 


Total Protein  


 


Albumin  


 


Globulin  


 


Albumin/Globulin Ratio  


 


Venous Blood Potassium

## 2018-11-29 LAB
BASOPHILS # BLD AUTO: 0 K/UL (ref 0–0.2)
BASOPHILS NFR BLD: 0.3 % (ref 0–2)
BUN SERPL-MCNC: 12 MG/DL (ref 9–20)
CALCIUM SERPL-MCNC: 8.7 MG/DL (ref 8.4–10.2)
EOSINOPHIL # BLD AUTO: 0.4 K/UL (ref 0–0.7)
EOSINOPHIL NFR BLD: 4.9 % (ref 0–4)
ERYTHROCYTE [DISTWIDTH] IN BLOOD BY AUTOMATED COUNT: 13.2 % (ref 11.5–14.5)
GFR NON-AFRICAN AMERICAN: > 60
HGB BLD-MCNC: 12.8 G/DL (ref 12–18)
LYMPHOCYTES # BLD AUTO: 1.2 K/UL (ref 1–4.3)
LYMPHOCYTES NFR BLD AUTO: 13.7 % (ref 20–40)
MCH RBC QN AUTO: 29.3 PG (ref 27–31)
MCHC RBC AUTO-ENTMCNC: 33.2 G/DL (ref 33–37)
MCV RBC AUTO: 88.4 FL (ref 80–94)
MONOCYTES # BLD: 0.7 K/UL (ref 0–0.8)
MONOCYTES NFR BLD: 8.2 % (ref 0–10)
NEUTROPHILS # BLD: 6.4 K/UL (ref 1.8–7)
NEUTROPHILS NFR BLD AUTO: 72.9 % (ref 50–75)
NRBC BLD AUTO-RTO: 0.1 % (ref 0–0)
PLATELET # BLD: 228 K/UL (ref 130–400)
PMV BLD AUTO: 8.7 FL (ref 7.2–11.7)
RBC # BLD AUTO: 4.38 MIL/UL (ref 4.4–5.9)
WBC # BLD AUTO: 8.8 K/UL (ref 4.8–10.8)

## 2018-11-29 PROCEDURE — 0J990ZZ DRAINAGE OF BUTTOCK SUBCUTANEOUS TISSUE AND FASCIA, OPEN APPROACH: ICD-10-PCS | Performed by: SURGERY

## 2018-11-29 PROCEDURE — 0JB90ZZ EXCISION OF BUTTOCK SUBCUTANEOUS TISSUE AND FASCIA, OPEN APPROACH: ICD-10-PCS | Performed by: SURGERY

## 2018-11-29 RX ADMIN — WATER SCH MLS/HR: 1 INJECTION INTRAMUSCULAR; INTRAVENOUS; SUBCUTANEOUS at 03:42

## 2018-11-29 NOTE — OP
PROCEDURE DATE:  11/29/2018





PREOPERATIVE DIAGNOSES:  Cellulitis and abscess of the right buttock.



POSTOPERATIVE DIAGNOSES:  Cellulitis and abscess of the right buttock.



PROCEDURE:  Incision and drainage and debridement of the necrotic tissue

and an abscess of the right buttock.



SURGEON:  Jonnathan Lizarraga MD.



ASSISTANT:   _____, surgical resident.



ANESTHESIA ADMINISTERED BY:  Rachel Grady MD.



TYPE OF ANESTHESIA:  General endotracheal intubation.



IV FLUIDS:  Crystalloids.



ESTIMATED BLOOD LOSS:  10 mL.



INTRAOPERATIVE FINDINGS:  Necrotic tissue and 10 mL of purulent material

expressed.



SPECIMEN:  Necrotic tissue.



BRIEF HISTORY:  Mr. Tam Hall is a very pleasant 42-year-old gentleman,

who presented to the hospital complaining of severe pain to the right

buttock area that started several days ago and as per patient it started as

a little pimple and subsequently developed into a big abscess.



All the risk and benefits of the procedure were explained to the patient

and with the patient having the full understanding of the all the risks and

benefits involved, informed consent was obtained, and the patient was taken

to the operating room for above stated procedure.



DESCRIPTION OF PROCEDURE:  The patient was brought into the operating room

and placed supine on the operating table.  After successful induction of

anesthesia and successful endotracheal intubation by the anesthesia team,

bilateral Flowtron boots were applied to the patient's lower extremities,

and subsequent to that the patient was placed in a left lateral decubitus

position and at this point in time, patient's right buttock area was

prepped with Betadine and draped in a standard surgical fashion.  Prior to

the beginning of the procedure, time-out was called in the room and

everyone in the room were in agreement.  Using a 15-blade scalpel knife,

the necrotic tissue on top of the abscess was excised and passed off to the

[facility] as a specimen.  At this point in time, some more necrotic

material was encountered deeper in the wound that was excised with 15-blade

scalpel knife.  Subsequent to that we were able to express approximately 10

mL of pus.  The patient's abscess cavity was irrigated and dried. 

Hemostasis was achieved with electrocautery and subsequent to that the

wound was packed with 0.25-inch iodoform packing.  At this point in time,

the patient's buttock was washed and dried and 4 x 4s, ABD, and some tape

were applied to the side of the wound.  The patient was successfully turned

back to the supine position, successfully extubated by the Anesthesia Team,

transferred to the stretcher and taken to the recovery room in a stable

condition.  At the end of the procedure, all instrument counts, needles,

and sponges were correct.







__________________________________________

Jonnathan Lizarraga MD



DD:  11/29/2018 9:56:33

DT:  11/29/2018 10:27:55

Job # [id number]

## 2018-11-29 NOTE — CP.PCM.PN
Subjective





- Date & Time of Evaluation


Date of Evaluation: 11/29/18


Time of Evaluation: 11:04





- Subjective


Subjective: 





Pt is a 43 y/o male patient seen and evaluated at the PACU S/P I&D of right 

gluteal cellulitis and abscess. Patient states that he doesn't have pain in his 

buttocks now. He denies any headache, fevers or chills. He denies any nausea, vo

mitting, diarrhea, constipation or abdominal pain. 


 




















Objective





- Vital Signs/Intake and Output


Vital Signs (last 24 hours): 


                                        











Temp Pulse Resp BP Pulse Ox


 


 97.7 F   93 H  20   125/74   100 


 


 11/29/18 10:06  11/29/18 10:06  11/29/18 10:06  11/29/18 10:06  11/29/18 10:06








Intake and Output: 


                                        











 11/29/18 11/29/18





 06:59 18:59


 


Intake Total  300


 


Balance  300














- Medications


Medications: 


                               Current Medications





Acetaminophen (Tylenol 325mg Tab)  650 mg PO Q6 PRN


   PRN Reason: fever


   Last Admin: 11/28/18 08:29 Dose:  650 mg


Docusate Sodium (Colace)  100 mg PO DAILY [facility]


   Last Admin: 11/28/18 08:30 Dose:  100 mg


Enoxaparin Sodium (Lovenox)  40 mg SC DAILY [facility]; Protocol


   Last Admin: 11/28/18 09:18 Dose:  40 mg


Hydromorphone HCl (Dilaudid)  0.5 mg IVP Q10M PRN


   PRN Reason: Pain, moderate (4-7)


Vancomycin HCl 1 gm/ Sodium (Chloride)  250 mls @ 166.667 mls/hr IVPB 

Q12@0400,1600 [facility]; Protocol


   Last Admin: 11/29/18 04:54 Dose:  166.667 mls/hr


Lactated Ringer's (Lactated Ringer's)  1,000 mls @ 125 mls/hr IV .Q8H [facility]


   Last Admin: 11/29/18 10:06 Dose:  0 mls


Morphine Sulfate (Morphine)  2 mg IVP Q4 PRN


   PRN Reason: Pain, severe (8-10)


Oxycodone/Acetaminophen (Percocet 5/325 Mg Tab)  1 tab PO Q4 PRN


   PRN Reason: Pain, Mild (1-3)


   Stop: 12/02/18 10:11


Oxycodone/Acetaminophen (Percocet 5/325 Mg Tab)  2 tab PO Q4 PRN


   PRN Reason: Pain, moderate (4-7)


   Stop: 12/02/18 10:11











- Labs


Labs: 


                                        





                                 11/29/18 05:45 





                                 11/29/18 05:45 





                                        











PT  13.9 Seconds (9.8-13.1)  H  11/27/18  12:45    


 


INR  1.2   11/27/18  12:45    


 


APTT  32.3 Seconds (25.6-37.1)   11/27/18  12:45    














- Constitutional


Appears: No Acute Distress





- Head Exam


Head Exam: ATRAUMATIC, NORMOCEPHALIC





- Eye Exam


Eye Exam: EOMI, Normal appearance, PERRL


Pupil Exam: NORMAL ACCOMODATION, PERRL





- ENT Exam


ENT Exam: Mucous Membranes Moist, Normal Exam





- Neck Exam


Neck Exam: Full ROM, Normal Inspection





- Respiratory Exam


Respiratory Exam: Clear to Ausculation Bilateral, NORMAL BREATHING PATTERN





- Cardiovascular Exam


Cardiovascular Exam: REGULAR RHYTHM, RRR





- GI/Abdominal Exam


GI & Abdominal Exam: Soft, Normal Bowel Sounds





- Extremities Exam


Extremities Exam: Normal Capillary Refill, Normal Inspection





- Back Exam


Back Exam: NORMAL INSPECTION





- Neurological Exam


Neurological Exam: Alert, Awake, Oriented x3


Neuro motor strength exam: Left Upper Extremity: 5, Right Upper Extremity: 5, 

Left Lower Extremity: 5, Right Lower Extremity: 5





- Psychiatric Exam


Psychiatric exam: Normal Affect, Normal Mood





- Skin


Skin Exam: Dry, Warm


Additional comments: 





Right gluteal region is dressed with DSD S/P I&D of the Right gluteal abscess























Assessment and Plan





- Assessment and Plan (Free Text)


Assessment: 





Pt is a 43 y/o male patient seen and evaluated S/P right gluteal cellulitis and 

abscess I&D.

















Plan: 





Cellulites, gluteal region


-Septic on admission: afebrile  WBC 8.8


-Source of infection likely Cellulitis


-C/W Vancoymycin and Zosyn IV abx


-Leg CT report: no drainable collection, no gas


-Surgery on board; reccs appreciated


-Patient went to the OR today, I&D of the right gluteal abscess done.


-BCx: No growth after 24 hours


-Wcx; MRSA


-UA; pending


-C/W Local wound Care as per surgery.


-C/W pain management as ordered





Diet


-Regular





DVT ppx


-Lovenox 40mg SC

## 2018-11-29 NOTE — RAD
Date of service: 



11/29/2018



HISTORY:

 pre-op 



COMPARISON:

No prior. 



FINDINGS:



LUNGS:

No active pulmonary disease.



PLEURA:

No significant pleural effusion identified, no pneumothorax apparent.



CARDIOVASCULAR:

No aortic atherosclerotic calcification present.



Normal cardiac size. No pulmonary vascular congestion. 



OSSEOUS STRUCTURES:

No significant abnormalities.



VISUALIZED UPPER ABDOMEN:

Normal.



OTHER FINDINGS:

None.



IMPRESSION:

No active disease.

## 2018-11-29 NOTE — PCM.SURG1
Surgeon's Initial Post Op Note





- Surgeon's Notes


Surgeon: Dr. Lizarraga


Assistant: Dr. Coles


Type of Anesthesia: General Endo


Pre-Operative Diagnosis: Right gluteal abscess


Operative Findings: right gluteal abscess with necrotic tissue and purulent 

drainage (10cc)


Post-Operative Diagnosis: Right gluteal abscess


Operation Performed: right gluteal abscess incision, drainage and debridement of

necrotic tissue


Specimen/Specimens Removed: necrotic tissue


Estimated Blood Loss: EBL {In ML}: 25


Blood Products Given: N/A


Drains Used: No Drains


Post-Op Condition: Fair


Date of Surgery/Procedure: 11/29/18


Time of Surgery/Procedure: 09:00

## 2018-11-29 NOTE — CARD
--------------- APPROVED REPORT --------------





Date of service: 11/29/2018



EKG Measurement

Heart [id number]

NM [id number]

[id number]

[id number]

[id number]



<Conclusion>

Normal sinus rhythm

Normal ECG

## 2018-11-30 VITALS — RESPIRATION RATE: 20 BRPM

## 2018-11-30 VITALS — OXYGEN SATURATION: 97 %

## 2018-11-30 LAB
ALBUMIN SERPL-MCNC: 3 G/DL (ref 3.5–5)
ALBUMIN/GLOB SERPL: 0.9 {RATIO} (ref 1–2.1)
ALT SERPL-CCNC: 37 U/L (ref 21–72)
AST SERPL-CCNC: 26 U/L (ref 17–59)
BASOPHILS # BLD AUTO: 0 K/UL (ref 0–0.2)
BASOPHILS NFR BLD: 0.5 % (ref 0–2)
BUN SERPL-MCNC: 16 MG/DL (ref 9–20)
CALCIUM SERPL-MCNC: 8.9 MG/DL (ref 8.4–10.2)
EOSINOPHIL # BLD AUTO: 0.3 K/UL (ref 0–0.7)
EOSINOPHIL NFR BLD: 3.7 % (ref 0–4)
ERYTHROCYTE [DISTWIDTH] IN BLOOD BY AUTOMATED COUNT: 12.9 % (ref 11.5–14.5)
GFR NON-AFRICAN AMERICAN: > 60
HGB BLD-MCNC: 13 G/DL (ref 12–18)
LYMPHOCYTES # BLD AUTO: 1.9 K/UL (ref 1–4.3)
LYMPHOCYTES NFR BLD AUTO: 28.3 % (ref 20–40)
MCH RBC QN AUTO: 28.9 PG (ref 27–31)
MCHC RBC AUTO-ENTMCNC: 32.6 G/DL (ref 33–37)
MCV RBC AUTO: 88.7 FL (ref 80–94)
MONOCYTES # BLD: 0.6 K/UL (ref 0–0.8)
MONOCYTES NFR BLD: 8.1 % (ref 0–10)
NEUTROPHILS # BLD: 4.1 K/UL (ref 1.8–7)
NEUTROPHILS NFR BLD AUTO: 59.4 % (ref 50–75)
NRBC BLD AUTO-RTO: 0.1 % (ref 0–0)
PLATELET # BLD: 252 K/UL (ref 130–400)
PMV BLD AUTO: 8.6 FL (ref 7.2–11.7)
RBC # BLD AUTO: 4.49 MIL/UL (ref 4.4–5.9)
WBC # BLD AUTO: 6.9 K/UL (ref 4.8–10.8)

## 2018-11-30 RX ADMIN — PURIFIED WATER PRN LOZ: 99.05 LIQUID OPHTHALMIC at 00:18

## 2018-11-30 RX ADMIN — PURIFIED WATER PRN LOZ: 99.05 LIQUID OPHTHALMIC at 04:43

## 2018-11-30 NOTE — CP.PCM.PN
Subjective





- Date & Time of Evaluation


Date of Evaluation: 11/30/18


Time of Evaluation: 09:44





- Subjective


Subjective: 





Pt is a 43 y/o male patient seen and evaluated at the bedside 1 day S/P I&D of 

right gluteal cellulitis and abscess. Patient states that he mild pain in his 

surgery site now. Patient complain of moderate pain with breathing and sorethro

at. He denies any headache, fevers or chills. He denies any nausea, vomitting, 

diarrhea, constipation or abdominal pain overnight. 


 











Objective





- Vital Signs/Intake and Output


Vital Signs (last 24 hours): 


                                        











Temp Pulse Resp BP Pulse Ox


 


 97.6 F   79   20   138/70   96 


 


 11/30/18 08:00  11/30/18 08:00  11/30/18 08:00  11/30/18 08:00  11/30/18 08:00











- Medications


Medications: 


                               Current Medications





Acetaminophen (Tylenol 325mg Tab)  650 mg PO Q6 PRN


   PRN Reason: fever


   Last Admin: 11/28/18 08:29 Dose:  650 mg


Benzocaine/Menthol (Cepacol Sore Throat)  1 raymond PO Q3 PRN


   PRN Reason: Sore Throat


   Last Admin: 11/30/18 04:43 Dose:  1 raymond


Docusate Sodium (Colace)  100 mg PO DAILY [facility]


   Last Admin: 11/30/18 09:43 Dose:  Not Given


Enoxaparin Sodium (Lovenox)  40 mg SC DAILY [facility]; Protocol


   Last Admin: 11/28/18 09:18 Dose:  40 mg


Vancomycin HCl 1 gm/ Sodium (Chloride)  250 mls @ 166.667 mls/hr IVPB 

Q12@0400,1600 [facility]; Protocol


   Last Admin: 11/30/18 04:38 Dose:  166.667 mls/hr


Morphine Sulfate (Morphine)  2 mg IVP Q4 PRN


   PRN Reason: Pain, severe (8-10)


Oxycodone/Acetaminophen (Percocet 5/325 Mg Tab)  1 tab PO Q4 PRN


   PRN Reason: Pain, Mild (1-3)


   Stop: 12/02/18 10:11


Oxycodone/Acetaminophen (Percocet 5/325 Mg Tab)  2 tab PO Q4 PRN


   PRN Reason: Pain, moderate (4-7)


   Stop: 12/02/18 10:11











- Labs


Labs: 


                                        





                                 11/30/18 05:50 





                                 11/30/18 05:50 





                                        











PT  13.9 Seconds (9.8-13.1)  H  11/27/18  12:45    


 


INR  1.2   11/27/18  12:45    


 


APTT  32.3 Seconds (25.6-37.1)   11/27/18  12:45    














- Constitutional


Appears: Well, Non-toxic, No Acute Distress





- Head Exam


Head Exam: ATRAUMATIC, NORMOCEPHALIC





- Eye Exam


Eye Exam: EOMI, Normal appearance, PERRL


Pupil Exam: NORMAL ACCOMODATION, PERRL





- ENT Exam


ENT Exam: Mucous Membranes Moist, Normal Exam





- Neck Exam


Neck Exam: Full ROM, Normal Inspection





- Respiratory Exam


Respiratory Exam: Clear to Ausculation Bilateral, NORMAL BREATHING PATTERN





- Cardiovascular Exam


Cardiovascular Exam: REGULAR RHYTHM, RRR





- GI/Abdominal Exam


GI & Abdominal Exam: Soft, Normal Bowel Sounds





- Extremities Exam


Extremities Exam: Normal Capillary Refill, Normal Inspection





- Back Exam


Back Exam: NORMAL INSPECTION





- Neurological Exam


Neurological Exam: Alert, Awake, Oriented x3


Neuro motor strength exam: Left Upper Extremity: 5, Right Upper Extremity: 5, 

Left Lower Extremity: 5, Right Lower Extremity: 5





- Psychiatric Exam


Psychiatric exam: Normal Affect, Normal Mood





- Skin


Skin Exam: Dry, Normal Color, Warm


Additional comments: 








Right gluteal region is dressed with DSD S/P I&D of the Right gluteal abscess


























Assessment and Plan





- Assessment and Plan (Free Text)


Assessment: 





Pt is a 43 y/o male patient seen and evaluated 1 day S/P right gluteal 

cellulitis and abscess I&D.

















C


Plan: 





allie gluteal region


-Septic on admission: afebrile  WBC 6.9


-Source of infection likely Cellulitis


-C/W Vancoymycin and Zosyn IV abx


-Leg CT report: no drainable collection, no gas


-Surgery on board; reccs appreciated


-Patient went to the OR today, I&D of the right gluteal abscess done.


-BCx: No growth after 24 hours


-Wcx; MRSA


-C/W Local wound Care as per surgery.


-Spoke to surgery who recommend the patient to stay one more da in the hospital 

with IV abx as he had a lot of necrotic tissues coming out from the abscess site


-Ordered benzocaine/menthole lozenges for sore-throat PO PRN


-C/W pain management as ordered





Diet


-Regular





DVT ppx


-Lovenox 40mg SC

## 2018-11-30 NOTE — CP.PCM.PN
<SanketGladys - Last Filed: 11/30/18 07:56>





Subjective





- Date & Time of Evaluation


Date of Evaluation: 11/30/18


Time of Evaluation: 07:00





- Subjective


Subjective: 


General surgery progress note Dr. Lizarraga





Patient seen and examined this am at bedside.  Patient reports one loose BM last

night with no blood.  He endorses mild pain at the surgical site, mild throat 

pain and mild thoracic pain.  We discussed that due to intubation and his 

surgical positioning mild back and throat pain are to be expected.  He otherwise

denies HA, CP, SOB, abdominal pain, n/v, f/c and extremity pain/ weakness








Objective





- Vital Signs/Intake and Output


Vital Signs (last 24 hours): 


                                        











Temp Pulse Resp BP Pulse Ox


 


 98.5 F   70   18   103/61   99 


 


 11/30/18 02:58  11/30/18 02:58  11/30/18 02:58  11/30/18 02:58  11/30/18 02:58











- Medications


Medications: 


                               Current Medications





Acetaminophen (Tylenol 325mg Tab)  650 mg PO Q6 PRN


   PRN Reason: fever


   Last Admin: 11/28/18 08:29 Dose:  650 mg


Benzocaine/Menthol (Cepacol Sore Throat)  1 raymond PO Q3 PRN


   PRN Reason: Sore Throat


   Last Admin: 11/30/18 04:43 Dose:  1 raymond


Docusate Sodium (Colace)  100 mg PO DAILY [facility]


   Last Admin: 11/29/18 09:00 Dose:  Not Given


Enoxaparin Sodium (Lovenox)  40 mg SC DAILY [facility]; Protocol


   Last Admin: 11/28/18 09:18 Dose:  40 mg


Hydromorphone HCl (Dilaudid)  0.5 mg IVP Q10M PRN


   PRN Reason: Pain, moderate (4-7)


Vancomycin HCl 1 gm/ Sodium (Chloride)  250 mls @ 166.667 mls/hr IVPB 

Q12@0400,1600 [facility]; Protocol


   Last Admin: 11/30/18 04:38 Dose:  166.667 mls/hr


Lactated Ringer's (Lactated Ringer's)  1,000 mls @ 125 mls/hr IV .Q8H [facility]


   Last Admin: 11/29/18 10:06 Dose:  120 mls


Morphine Sulfate (Morphine)  2 mg IVP Q4 PRN


   PRN Reason: Pain, severe (8-10)


Oxycodone/Acetaminophen (Percocet 5/325 Mg Tab)  1 tab PO Q4 PRN


   PRN Reason: Pain, Mild (1-3)


   Stop: 12/02/18 10:11


Oxycodone/Acetaminophen (Percocet 5/325 Mg Tab)  2 tab PO Q4 PRN


   PRN Reason: Pain, moderate (4-7)


   Stop: 12/02/18 10:11











- Labs


Labs: 


                                        





                                 11/30/18 05:50 





                                 11/30/18 05:50 





                                        











PT  13.9 Seconds (9.8-13.1)  H  11/27/18  12:45    


 


INR  1.2   11/27/18  12:45    


 


APTT  32.3 Seconds (25.6-37.1)   11/27/18  12:45    














- Constitutional


Appears: Well, Non-toxic, No Acute Distress





- Head Exam


Head Exam: ATRAUMATIC, NORMOCEPHALIC





- Eye Exam


Eye Exam: EOMI





- ENT Exam


ENT Exam: Mucous Membranes Moist





- Respiratory Exam


Respiratory Exam: NORMAL BREATHING PATTERN





- Cardiovascular Exam


Cardiovascular Exam: REGULAR RHYTHM





- GI/Abdominal Exam


GI & Abdominal Exam: Soft.  absent: Distended, Guarding, Tenderness





- Extremities Exam


Extremities Exam: absent: Calf Tenderness, Pedal Edema


Additional comments: 


right gluteal dressing site cdi will change tomorrow








- Neurological Exam


Neurological Exam: Alert, Awake, Oriented x3





- Psychiatric Exam


Psychiatric exam: Normal Affect, Normal Mood





- Skin


Skin Exam: Warm


Additional comments: 


dressing at right gluteal site is cdi, will be changed tomorrow with packing 

change








Assessment and Plan





- Assessment and Plan (Free Text)


Assessment: 


42 yr old male with right gluteal abscess s/p I&D with debridement POD 1





Plan: 


c/w abx


change dressing and packing tomorrow


encourage OOBTC and ambulation


c/w pain control


will d/w Dr. Elham Coles, PGY 1





<Jonnathan Lizarraga - Last Filed: 11/30/18 11:53>





Subjective





- Date & Time of Evaluation


Time of Evaluation: 11:30





- Subjective


Subjective: 





Patient was seen and examined at the bedside.  Agree with resident's note above.





Objective





- Vital Signs/Intake and Output


Vital Signs (last 24 hours): 


                                        











Temp Pulse Resp BP Pulse Ox


 


 97.6 F   79   20   138/70   96 


 


 11/30/18 08:00  11/30/18 08:00  11/30/18 08:00  11/30/18 08:00  11/30/18 08:00











- Medications


Medications: 


                               Current Medications





Acetaminophen (Tylenol 325mg Tab)  650 mg PO Q6 PRN


   PRN Reason: fever


   Last Admin: 11/28/18 08:29 Dose:  650 mg


Benzocaine/Menthol (Cepacol Sore Throat)  1 raymond PO Q3 PRN


   PRN Reason: Sore Throat


   Last Admin: 11/30/18 04:43 Dose:  1 raymond


Docusate Sodium (Colace)  100 mg PO DAILY [facility]


   Last Admin: 11/30/18 09:43 Dose:  Not Given


Enoxaparin Sodium (Lovenox)  40 mg SC DAILY [facility]; Protocol


   Last Admin: 11/28/18 09:18 Dose:  40 mg


Vancomycin HCl 1 gm/ Sodium (Chloride)  250 mls @ 166.667 mls/hr IVPB 

Q12@0400,1600 [facility]; Protocol


   Last Admin: 11/30/18 04:38 Dose:  166.667 mls/hr


Morphine Sulfate (Morphine)  2 mg IVP Q4 PRN


   PRN Reason: Pain, severe (8-10)


Oxycodone/Acetaminophen (Percocet 5/325 Mg Tab)  1 tab PO Q4 PRN


   PRN Reason: Pain, Mild (1-3)


   Stop: 12/02/18 10:11


Oxycodone/Acetaminophen (Percocet 5/325 Mg Tab)  2 tab PO Q4 PRN


   PRN Reason: Pain, moderate (4-7)


   Stop: 12/02/18 10:11











- Labs


Labs: 


                                        





                                 11/30/18 05:50 





                                 11/30/18 05:50 





                                        











PT  13.9 Seconds (9.8-13.1)  H  11/27/18  12:45    


 


INR  1.2   11/27/18  12:45    


 


APTT  32.3 Seconds (25.6-37.1)   11/27/18  12:45

## 2018-12-01 VITALS — HEART RATE: 71 BPM | SYSTOLIC BLOOD PRESSURE: 111 MMHG | DIASTOLIC BLOOD PRESSURE: 75 MMHG | TEMPERATURE: 97.6 F

## 2018-12-01 LAB
ALBUMIN SERPL-MCNC: 3.3 G/DL (ref 3.5–5)
ALBUMIN/GLOB SERPL: 0.9 {RATIO} (ref 1–2.1)
ALT SERPL-CCNC: 47 U/L (ref 21–72)
AST SERPL-CCNC: 30 U/L (ref 17–59)
BASOPHILS # BLD AUTO: 0 K/UL (ref 0–0.2)
BASOPHILS NFR BLD: 0.7 % (ref 0–2)
BUN SERPL-MCNC: 20 MG/DL (ref 9–20)
CALCIUM SERPL-MCNC: 9.3 MG/DL (ref 8.4–10.2)
EOSINOPHIL # BLD AUTO: 0.5 K/UL (ref 0–0.7)
EOSINOPHIL NFR BLD: 8.7 % (ref 0–4)
ERYTHROCYTE [DISTWIDTH] IN BLOOD BY AUTOMATED COUNT: 12.9 % (ref 11.5–14.5)
GFR NON-AFRICAN AMERICAN: > 60
HGB BLD-MCNC: 13.7 G/DL (ref 12–18)
LYMPHOCYTES # BLD AUTO: 1.6 K/UL (ref 1–4.3)
LYMPHOCYTES NFR BLD AUTO: 30.4 % (ref 20–40)
MCH RBC QN AUTO: 28.8 PG (ref 27–31)
MCHC RBC AUTO-ENTMCNC: 33.2 G/DL (ref 33–37)
MCV RBC AUTO: 86.7 FL (ref 80–94)
MONOCYTES # BLD: 0.4 K/UL (ref 0–0.8)
MONOCYTES NFR BLD: 8.3 % (ref 0–10)
NEUTROPHILS # BLD: 2.7 K/UL (ref 1.8–7)
NEUTROPHILS NFR BLD AUTO: 51.9 % (ref 50–75)
NRBC BLD AUTO-RTO: 0.1 % (ref 0–0)
PLATELET # BLD: 262 K/UL (ref 130–400)
PMV BLD AUTO: 8.4 FL (ref 7.2–11.7)
RBC # BLD AUTO: 4.77 MIL/UL (ref 4.4–5.9)
WBC # BLD AUTO: 5.2 K/UL (ref 4.8–10.8)

## 2018-12-01 RX ADMIN — ENOXAPARIN SODIUM SCH MG: 40 INJECTION SUBCUTANEOUS at 08:59

## 2018-12-01 NOTE — CP.PCM.PN
Subjective





- Date & Time of Evaluation


Date of Evaluation: 12/01/18


Time of Evaluation: 07:00





- Subjective


Subjective: 





SURGERY NOTE FOR DR. DE LA PAZ





42M seen and examined at bedside. No acute events overnight. Patient doing well 

this AM, Pain controlled with medication, Tolerating diet. 





Objective





- Vital Signs/Intake and Output


Vital Signs (last 24 hours): 


                                        











Temp Pulse Resp BP Pulse Ox


 


 97.6 F   71   20   111/75   97 


 


 12/01/18 07:38  12/01/18 07:38  12/01/18 07:38  12/01/18 07:38  12/01/18 07:38











- Medications


Medications: 


                               Current Medications





Acetaminophen (Tylenol 325mg Tab)  650 mg PO Q6 PRN


   PRN Reason: fever


   Last Admin: 12/01/18 09:03 Dose:  650 mg


Benzocaine/Menthol (Cepacol Sore Throat)  1 raymond PO Q3 PRN


   PRN Reason: Sore Throat


   Last Admin: 11/30/18 04:43 Dose:  1 raymond


Docusate Sodium (Colace)  100 mg PO DAILY [facility]


   Last Admin: 12/01/18 08:59 Dose:  100 mg


Enoxaparin Sodium (Lovenox)  40 mg SC DAILY [facility]; Protocol


   Last Admin: 12/01/18 08:59 Dose:  40 mg


Vancomycin HCl 1 gm/ Sodium (Chloride)  250 mls @ 166.667 mls/hr IVPB 

Q12@0400,1600 [facility]; Protocol


   Last Admin: 12/01/18 03:22 Dose:  166.667 mls/hr


Morphine Sulfate (Morphine)  2 mg IVP Q4 PRN


   PRN Reason: Pain, severe (8-10)


Oxycodone/Acetaminophen (Percocet 5/325 Mg Tab)  1 tab PO Q4 PRN


   PRN Reason: Pain, Mild (1-3)


   Stop: 12/02/18 10:11


Oxycodone/Acetaminophen (Percocet 5/325 Mg Tab)  2 tab PO Q4 PRN


   PRN Reason: Pain, moderate (4-7)


   Stop: 12/02/18 10:11











- Labs


Labs: 


                                        





                                 12/01/18 06:30 





                                 12/01/18 06:30 





                                        











PT  13.9 Seconds (9.8-13.1)  H  11/27/18  12:45    


 


INR  1.2   11/27/18  12:45    


 


APTT  32.3 Seconds (25.6-37.1)   11/27/18  12:45    














- Constitutional


Appears: Non-toxic, No Acute Distress





- Respiratory Exam


Respiratory Exam: Clear to Ausculation Bilateral, NORMAL BREATHING PATTERN





- Cardiovascular Exam


Cardiovascular Exam: REGULAR RHYTHM, +S1, +S2





- GI/Abdominal Exam


GI & Abdominal Exam: Soft.  absent: Distended, Firm, Guarding, Rigid, 

Tenderness, Rebound





- Rectal Exam


Additional comments: 





right buttock wound s/p debridement of abscess/necrotic tissue


base of wound clean, non purulent


Skin around wound CDI





- Extremities Exam


Extremities Exam: absent: Pedal Edema, Tenderness





- Neurological Exam


Neurological Exam: Alert, Awake





- Skin


Skin Exam: Dry, Intact, Normal Color, Warm





Assessment and Plan





- Assessment and Plan (Free Text)


Assessment: 





42M s/p right gluteal wound/abscess incision and drainage with debridement POD#2


Plan: 





- Packing changed this AM


- Continue antibiotics


- Patient may be DC'd with PO antibiotics


Discussed with Dr. Elham Pabon, PGY3

## 2018-12-01 NOTE — CP.PCM.DIS
<Seth Scott - Last Filed: 12/01/18 17:40>





Provider





- Provider


Date of Admission: 


11/29/18 18:18





Attending physician: 


Kasey Larson MD





Consults: 








11/27/18 17:25


Nursing Referral for Wound Care Routine 


   Comment: 


   Physician Instructions: 


   Reason For Exam: Gluteal Abscess





11/27/18 19:23


Surgery [General Surgery Consult] Routine 


   Comment: 


   Consulting Provider: Rolando Pace


   Consulting Physician: Rolando Pace


   Reason for Consult: cellulitis and abscess right gluteal area











Time Spent in preparation of Discharge (in minutes): 30





Hospital Course





- Lab Results


Lab Results: 


                                  Micro Results





11/27/18 12:30   Blood   Blood Culture - Preliminary


                            NO GROWTH AFTER 4 DAYS


11/27/18 12:45   Abscess - Buttocks   Gram Stain - Final


11/27/18 12:45   Abscess - Buttocks   Wound Culture - Final


                            Methicillin Resistant S Aureus





                             Most Recent Lab Values











WBC  5.2 K/uL (4.8-10.8)   12/01/18  06:30    


 


RBC  4.77 Mil/uL (4.40-5.90)   12/01/18  06:30    


 


Hgb  13.7 g/dL (12.0-18.0)   12/01/18  06:30    


 


Hct  41.3 % (35.0-51.0)   12/01/18  06:30    


 


MCV  86.7 fl (80.0-94.0)  D 12/01/18  06:30    


 


MCH  28.8 pg (27.0-31.0)   12/01/18  06:30    


 


MCHC  33.2 g/dL (33.0-37.0)   12/01/18  06:30    


 


RDW  12.9 % (11.5-14.5)   12/01/18  06:30    


 


Plt Count  262 K/uL (130-400)   12/01/18  06:30    


 


MPV  8.4 fl (7.2-11.7)   12/01/18  06:30    


 


Neut % (Auto)  51.9 % (50.0-75.0)   12/01/18  06:30    


 


Lymph % (Auto)  30.4 % (20.0-40.0)   12/01/18  06:30    


 


Mono % (Auto)  8.3 % (0.0-10.0)   12/01/18  06:30    


 


Eos % (Auto)  8.7 % (0.0-4.0)  H  12/01/18  06:30    


 


Baso % (Auto)  0.7 % (0.0-2.0)   12/01/18  06:30    


 


Neut # (Auto)  2.7 K/uL (1.8-7.0)   12/01/18  06:30    


 


Lymph # (Auto)  1.6 K/uL (1.0-4.3)   12/01/18  06:30    


 


Mono # (Auto)  0.4 K/uL (0.0-0.8)   12/01/18  06:30    


 


Eos # (Auto)  0.5 K/uL (0.0-0.7)   12/01/18  06:30    


 


Baso # (Auto)  0.0 K/uL (0.0-0.2)   12/01/18  06:30    


 


Neutrophils % (Manual)  85 % (42-75)  H  11/27/18  12:45    


 


Band Neutrophils %  2 % (0-2)   11/27/18  12:45    


 


Lymphocytes % (Manual)  3 % (20-50)  L  11/27/18  12:45    


 


Reactive Lymphs %  1 % (0-0)  H  11/27/18  12:45    


 


Monocytes % (Manual)  8 % (0-10)   11/27/18  12:45    


 


Eosinophils % (Manual)  1 % (0-7)   11/27/18  12:45    


 


Platelet Estimate  Normal  (NORMAL)   11/27/18  12:45    


 


RBC Morphology  Normal  (NORMAL)   11/27/18  12:45    


 


PT  13.9 Seconds (9.8-13.1)  H  11/27/18  12:45    


 


INR  1.2   11/27/18  12:45    


 


APTT  32.3 Seconds (25.6-37.1)   11/27/18  12:45    


 


pO2  38 mm/Hg (30-55)   11/27/18  12:23    


 


VBG pH  7.44  (7.32-7.43)  H  11/27/18  12:23    


 


VBG pCO2  42 mmHg (40-60)   11/27/18  12:23    


 


VBG HCO3  27.3 mmol/L  11/27/18  12:23    


 


VBG Total CO2  29.8 mmol/L (22-28)  H  11/27/18  12:23    


 


VBG O2 Sat (Calc)  80.4 % (40-65)  H  11/27/18  12:23    


 


VBG Base Excess  3.8 mmol/L (0.0-2.0)  H  11/27/18  12:23    


 


VBG Potassium  3.6 mmol/L (3.6-5.2)   11/27/18  12:23    


 


Sodium  134.0 mmol/L (132-148)   11/27/18  12:23    


 


Chloride  101.0 mmol/L ()   11/27/18  12:23    


 


Glucose  104 mg/dL ()   11/27/18  12:23    


 


Lactate  1.1 mmol/L (0.7-2.1)   11/27/18  12:23    


 


FiO2  21.0 %  11/27/18  12:23    


 


Sodium  141 mmol/l (132-148)   12/01/18  06:30    


 


Potassium  4.2 MMOL/L (3.6-5.0)   12/01/18  06:30    


 


Chloride  104 mmol/L ()   12/01/18  06:30    


 


Carbon Dioxide  29 mmol/L (22-30)   12/01/18  06:30    


 


Anion Gap  12  (10-20)   12/01/18  06:30    


 


BUN  20 mg/dl (9-20)   12/01/18  06:30    


 


Creatinine  1.1 mg/dl (0.8-1.5)   12/01/18  06:30    


 


Est GFR ( Amer)  > 60   12/01/18  06:30    


 


Est GFR (Non-Af Amer)  > 60   12/01/18  06:30    


 


Random Glucose  94 mg/dL ()   12/01/18  06:30    


 


Calcium  9.3 mg/dL (8.4-10.2)   12/01/18  06:30    


 


Phosphorus  4.1 mg/dl (2.5-4.5)   12/01/18  06:30    


 


Magnesium  2.1 MG/DL (1.6-2.3)   12/01/18  06:30    


 


Total Bilirubin  0.2 mg/dl (0.2-1.3)   12/01/18  06:30    


 


AST  30 U/L (17-59)   12/01/18  06:30    


 


ALT  47 U/L (21-72)   12/01/18  06:30    


 


Alkaline Phosphatase  55 U/L ()   12/01/18  06:30    


 


Total Protein  6.8 G/DL (6.3-8.2)   12/01/18  06:30    


 


Albumin  3.3 g/dL (3.5-5.0)  L  12/01/18  06:30    


 


Globulin  3.5 gm/dL (2.2-3.9)   12/01/18  06:30    


 


Albumin/Globulin Ratio  0.9  (1.0-2.1)  L  12/01/18  06:30    


 


Venous Blood Potassium  3.6 mmol/L (3.6-5.2)   11/27/18  12:23    


 


HIV 1&2 Ag/Ab, 4th Gen  Nonreactive  (Nonreactive)   11/27/18  15:04    














- Hospital Course


Hospital Course: 





43 yo M admitted for abscess and cellulitis in R Gluteus.


On admission, WBC: 14K. 


Abx: Clindamycin, IV Vancomycin and Zosyn


Blood cultures: no growth


Wound cultures: MRSA


General Surgery: I&D of Right gluteal abscess and removal of necrotic tissue; 

wound packed and dressing applied (last changed 12/01/2018). 


Pt tolerated procedure well and health improved during hospital course. On day 

of discharge, IV vancomycin discontinued and Clindamycin 300 mg PO tab QID for 

10 days #40 prescribed and sent to [facility] pharmacy.


Pt to follow up with pmd at [facility] in 3-5 days along with Surgery clinic with Dr. Fraire for wound check and dressing change. 


ER precautions reviewed with patient along with educational information given.





Case dw Dr. Donte Scott MD PGY2





Discharge Exam





- Head Exam


Head Exam: ATRAUMATIC, NORMOCEPHALIC





- Eye Exam


Eye Exam: EOMI





- ENT Exam


ENT Exam: Mucous Membranes Moist





- Respiratory Exam


Respiratory Exam: Clear to PA & Lateral, NORMAL BREATHING PATTERN.  absent: 

Wheezes





- Cardiovascular Exam


Cardiovascular Exam: REGULAR RHYTHM, +S1, +S2





- GI/Abdominal Exam


GI & Abdominal Exam: Normal Bowel Sounds, Soft.  absent: Tenderness





-  Exam


Additional comments: 





R gluteal exam: packing in place. site of surrounding incision CDI. No 

significant erythema. Gauze overlying wound dry. 





- Neurological Exam


Neurological exam: Alert, CN II-XII Intact, Oriented x3





- Psychiatric Exam


Psychiatric exam: Normal Affect, Normal Mood





Discharge Plan





- Discharge Medications


Prescriptions: 


RX: Clindamycin [Cleocin] 300 mg PO QID 10 Days #40 cap





- Follow Up Plan


Condition: FAIR


Disposition: HOME/ ROUTINE


Instructions:  Cellulitis (Skin Infection), Adult (DC)


Additional Instructions: 


Discharge home to take 


Please follow up with general surgery clinic: Dr. Fraire for wound check 


Please follow up Family medicine at [facility] for further health maintenance


ER precautions reviewed


Referrals: 


Will Fraire MD [Staff Provider] - 





<Bridgette Kent - Last Filed: 12/02/18 09:19>





Provider





- Provider


Date of Admission: 


11/29/18 18:18





Attending physician: 


Kasey Larson MD





Consults: 








11/27/18 17:25


Nursing Referral for Wound Care Routine 


   Comment: 


   Physician Instructions: 


   Reason For Exam: Gluteal Abscess





11/27/18 19:23


Surgery [General Surgery Consult] Routine 


   Comment: 


   Consulting Provider: Rolando Pace


   Consulting Physician: Rolando Pace


   Reason for Consult: cellulitis and abscess right gluteal area














Hospital Course





- Lab Results


Lab Results: 


                                  Micro Results





11/27/18 12:30   Blood   Blood Culture - Preliminary


                            NO GROWTH AFTER 4 DAYS


11/27/18 12:45   Abscess - Buttocks   Gram Stain - Final


11/27/18 12:45   Abscess - Buttocks   Wound Culture - Final


                            Methicillin Resistant S Aureus





                             Most Recent Lab Values











WBC  5.2 K/uL (4.8-10.8)   12/01/18  06:30    


 


RBC  4.77 Mil/uL (4.40-5.90)   12/01/18  06:30    


 


Hgb  13.7 g/dL (12.0-18.0)   12/01/18  06:30    


 


Hct  41.3 % (35.0-51.0)   12/01/18  06:30    


 


MCV  86.7 fl (80.0-94.0)  D 12/01/18  06:30    


 


MCH  28.8 pg (27.0-31.0)   12/01/18  06:30    


 


MCHC  33.2 g/dL (33.0-37.0)   12/01/18  06:30    


 


RDW  12.9 % (11.5-14.5)   12/01/18  06:30    


 


Plt Count  262 K/uL (130-400)   12/01/18  06:30    


 


MPV  8.4 fl (7.2-11.7)   12/01/18  06:30    


 


Neut % (Auto)  51.9 % (50.0-75.0)   12/01/18  06:30    


 


Lymph % (Auto)  30.4 % (20.0-40.0)   12/01/18  06:30    


 


Mono % (Auto)  8.3 % (0.0-10.0)   12/01/18  06:30    


 


Eos % (Auto)  8.7 % (0.0-4.0)  H  12/01/18  06:30    


 


Baso % (Auto)  0.7 % (0.0-2.0)   12/01/18  06:30    


 


Neut # (Auto)  2.7 K/uL (1.8-7.0)   12/01/18  06:30    


 


Lymph # (Auto)  1.6 K/uL (1.0-4.3)   12/01/18  06:30    


 


Mono # (Auto)  0.4 K/uL (0.0-0.8)   12/01/18  06:30    


 


Eos # (Auto)  0.5 K/uL (0.0-0.7)   12/01/18  06:30    


 


Baso # (Auto)  0.0 K/uL (0.0-0.2)   12/01/18  06:30    


 


Neutrophils % (Manual)  85 % (42-75)  H  11/27/18  12:45    


 


Band Neutrophils %  2 % (0-2)   11/27/18  12:45    


 


Lymphocytes % (Manual)  3 % (20-50)  L  11/27/18  12:45    


 


Reactive Lymphs %  1 % (0-0)  H  11/27/18  12:45    


 


Monocytes % (Manual)  8 % (0-10)   11/27/18  12:45    


 


Eosinophils % (Manual)  1 % (0-7)   11/27/18  12:45    


 


Platelet Estimate  Normal  (NORMAL)   11/27/18  12:45    


 


RBC Morphology  Normal  (NORMAL)   11/27/18  12:45    


 


PT  13.9 Seconds (9.8-13.1)  H  11/27/18  12:45    


 


INR  1.2   11/27/18  12:45    


 


APTT  32.3 Seconds (25.6-37.1)   11/27/18  12:45    


 


pO2  38 mm/Hg (30-55)   11/27/18  12:23    


 


VBG pH  7.44  (7.32-7.43)  H  11/27/18  12:23    


 


VBG pCO2  42 mmHg (40-60)   11/27/18  12:23    


 


VBG HCO3  27.3 mmol/L  11/27/18  12:23    


 


VBG Total CO2  29.8 mmol/L (22-28)  H  11/27/18  12:23    


 


VBG O2 Sat (Calc)  80.4 % (40-65)  H  11/27/18  12:23    


 


VBG Base Excess  3.8 mmol/L (0.0-2.0)  H  11/27/18  12:23    


 


VBG Potassium  3.6 mmol/L (3.6-5.2)   11/27/18  12:23    


 


Sodium  134.0 mmol/L (132-148)   11/27/18  12:23    


 


Chloride  101.0 mmol/L ()   11/27/18  12:23    


 


Glucose  104 mg/dL ()   11/27/18  12:23    


 


Lactate  1.1 mmol/L (0.7-2.1)   11/27/18  12:23    


 


FiO2  21.0 %  11/27/18  12:23    


 


Sodium  141 mmol/l (132-148)   12/01/18  06:30    


 


Potassium  4.2 MMOL/L (3.6-5.0)   12/01/18  06:30    


 


Chloride  104 mmol/L ()   12/01/18  06:30    


 


Carbon Dioxide  29 mmol/L (22-30)   12/01/18  06:30    


 


Anion Gap  12  (10-20)   12/01/18  06:30    


 


BUN  20 mg/dl (9-20)   12/01/18  06:30    


 


Creatinine  1.1 mg/dl (0.8-1.5)   12/01/18  06:30    


 


Est GFR ( Amer)  > 60   12/01/18  06:30    


 


Est GFR (Non-Af Amer)  > 60   12/01/18  06:30    


 


Random Glucose  94 mg/dL ()   12/01/18  06:30    


 


Calcium  9.3 mg/dL (8.4-10.2)   12/01/18  06:30    


 


Phosphorus  4.1 mg/dl (2.5-4.5)   12/01/18  06:30    


 


Magnesium  2.1 MG/DL (1.6-2.3)   12/01/18  06:30    


 


Total Bilirubin  0.2 mg/dl (0.2-1.3)   12/01/18  06:30    


 


AST  30 U/L (17-59)   12/01/18  06:30    


 


ALT  47 U/L (21-72)   12/01/18  06:30    


 


Alkaline Phosphatase  55 U/L ()   12/01/18  06:30    


 


Total Protein  6.8 G/DL (6.3-8.2)   12/01/18  06:30    


 


Albumin  3.3 g/dL (3.5-5.0)  L  12/01/18  06:30    


 


Globulin  3.5 gm/dL (2.2-3.9)   12/01/18  06:30    


 


Albumin/Globulin Ratio  0.9  (1.0-2.1)  L  12/01/18  06:30    


 


Venous Blood Potassium  3.6 mmol/L (3.6-5.2)   11/27/18  12:23    


 


HIV 1&2 Ag/Ab, 4th Gen  Nonreactive  (Nonreactive)   11/27/18  15:04    














Attending/Attestation





- Attestation


I have personally seen and examined this patient.: Yes


I have fully participated in the care of the patient.: Yes


I have reviewed all pertinent clinical information, including history, physical 

exam and plan: Yes


Notes (Text): 





12/02/18 09:19


Seen examined and discussed with resident. 


Agree with findings and plan as above.

## 2019-03-03 ENCOUNTER — HOSPITAL ENCOUNTER (EMERGENCY)
Dept: HOSPITAL 14 - H.ER | Age: 44
Discharge: HOME | End: 2019-03-03
Payer: COMMERCIAL

## 2019-03-03 VITALS — BODY MASS INDEX: 20.3 KG/M2

## 2019-03-03 VITALS — OXYGEN SATURATION: 100 %

## 2019-03-03 VITALS — DIASTOLIC BLOOD PRESSURE: 70 MMHG | TEMPERATURE: 98.5 F | SYSTOLIC BLOOD PRESSURE: 105 MMHG

## 2019-03-03 VITALS — RESPIRATION RATE: 18 BRPM | HEART RATE: 99 BPM

## 2019-03-03 DIAGNOSIS — Z48.01: Primary | ICD-10-CM

## 2019-03-03 NOTE — ED PDOC
HPI: Skin/Bite Injury


Time Seen by Provider: 03/03/19 18:12


Chief Complaint (Nursing): Abnormal Skin Integrity


Chief Complaint (Provider): Abscess


History Per: Patient


History/Exam Limitations: no limitations


Onset/Duration Of Symptoms: Days (x2)


Current Symptoms Are (Timing): Better


Additional Complaint(s): 


43 year old male presents to the ED for reevaluation of a right buttock abscess 

that was drained two days ago by this provider. Patient reports feeling much 

better and notes compliancy with his Clindamycin. Otherwise denies fever and 

Tylenol / Motrin use.





PMD: none provided





Past Medical History


Reviewed: Historical Data, Nursing Documentation, Vital Signs


Vital Signs: 





                                Last Vital Signs











Temp  98.5 F   03/03/19 18:18


 


Pulse  112 H  03/03/19 18:18


 


Resp  19   03/03/19 18:18


 


BP  105/70   03/03/19 18:18


 


Pulse Ox  100   03/03/19 18:18














- Medical History


PMH: No Chronic Diseases


   Denies: Chronic Kidney Disease





- Surgical History


Other surgeries: I&D of abscess





- Family History


Family History: States: Unknown Family Hx





- Social History


Current smoker - smoking cessation education provided: No


Alcohol: None


Drugs: Denies





- Home Medications


Home Medications: 


                                Ambulatory Orders











 Medication  Instructions  Recorded


 


Clindamycin [Cleocin] 300 mg PO QID 10 Days #40 cap 12/01/18


 


Clindamycin [Cleocin] 300 mg PO TID #20 cap 03/01/19














- Allergies


Allergies/Adverse Reactions: 


                                    Allergies











Allergy/AdvReac Type Severity Reaction Status Date / Time


 


No Known Allergies Allergy   Verified 11/27/18 10:56














Review of Systems


ROS Statement: Except As Marked, All Systems Reviewed And Found Negative


Constitutional: Negative for: Fever


Skin: Positive for: Other (healing abscess to right buttock)





Physical Exam





- Reviewed


Nursing Documentation Reviewed: Yes


Vital Signs Reviewed: Yes





- Physical Exam


Appears: Positive for: No Acute Distress


Extremity: Positive for: Other (right buttock incised and drained wound is 

healing well with no surrounding erythema or discharge - improved from last 

visit)





- ECG


O2 Sat by Pulse Oximetry: 100 (RA)


Pulse Ox Interpretation: Normal





Medical Decision Making


Medical Decision Making: 


Time: 1835


Initial Impression: healing abscess


Initial Plan:


--Wound covered with dry sterile dressing. Patient stable for discharge with 

instructions to complete his Clindamycin. Return parameters discussed and 

verbalized agreement and understanding of plan.





-------------------------------------

------------------------------------------------------------ 


Scribe Attestation:


Documented by Nay Jones, acting as a scribe for Ye Little PA-C.


Provider Scribe Attestation:


All medical record entries made by the Scribe were at my direction and 

personally dictated by me. I have reviewed the chart and agree that the record 

accurately reflects my personal performance of the history, physical exam, 

medical decision making, and the department course for this patient. I have also

 personally directed, reviewed, and agree with the discharge instructions and 

disposition.





Disposition





- Clinical Impression


Clinical Impression: 


 Visit for wound check








- Patient ED Disposition


Is Patient to be Admitted: No





- Disposition


Referrals: 


[facility] [Outside]


Disposition: Routine/Home


Disposition Time: 18:37


Condition: STABLE


Additional Instructions: 


CONTINUE TAKING CLINDAMYCIN


RETURN TO ED IMMEDIATELY IF SYMPTOMS WORSEN





NATO PADILLA, thank you for letting us take care of you today. Your 

provider was Paula Mccollum MD and you were treated for WOUND CHECK. The 

emergency medical care you received today was directed at your acute symptoms. 

If you were prescribed any medication, please fill it and take as directed. It 

may take several days for your symptoms to resolve. Return to the Emergency 

Department if your symptoms worsen, do not improve, or if you have any other 

problems.





Please contact your doctor or call one of the physicians/clinics you have been 

referred to that are listed on the Patient Visit Information form that is 

included in your discharge packet. Bring any paperwork you were given at 

discharge with you along with any medications you are taking to your follow up 

visit. Our treatment cannot replace ongoing medical care by a primary care 

provider outside of the emergency department.





Thank you for allowing the [facility] team to be part of your care today.








If you had an X-Ray or CT scan: A Radiologist will review the ED reading if any 

change in treatment is needed we will contact you.***





If you had a blood, urine, or wound culture: It will take several days for the 

results, if any change in treatment is needed we will contact you.***





If you had an STI test: It will take 48 hours for the results. Please call after

 1 week if you have not heard back.***


Instructions:  Wound Care (DC)


Forms:  [facility] (English)